# Patient Record
Sex: FEMALE | Race: WHITE | Employment: STUDENT | URBAN - METROPOLITAN AREA
[De-identification: names, ages, dates, MRNs, and addresses within clinical notes are randomized per-mention and may not be internally consistent; named-entity substitution may affect disease eponyms.]

---
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
## 2021-10-09 ENCOUNTER — HOSPITAL ENCOUNTER (EMERGENCY)
Age: 21
Discharge: LEFT BEFORE DISCHARGE | End: 2021-10-09
Payer: COMMERCIAL

## 2021-10-09 VITALS
DIASTOLIC BLOOD PRESSURE: 68 MMHG | WEIGHT: 140 LBS | HEIGHT: 64 IN | OXYGEN SATURATION: 100 % | RESPIRATION RATE: 20 BRPM | HEART RATE: 82 BPM | SYSTOLIC BLOOD PRESSURE: 105 MMHG | TEMPERATURE: 99.1 F | BODY MASS INDEX: 23.9 KG/M2

## 2021-10-10 ENCOUNTER — HOSPITAL ENCOUNTER (EMERGENCY)
Age: 21
Discharge: HOME | End: 2021-10-10
Attending: EMERGENCY MEDICINE
Payer: COMMERCIAL

## 2021-10-10 VITALS
TEMPERATURE: 98.3 F | OXYGEN SATURATION: 99 % | BODY MASS INDEX: 23.9 KG/M2 | HEART RATE: 81 BPM | HEIGHT: 64 IN | SYSTOLIC BLOOD PRESSURE: 110 MMHG | RESPIRATION RATE: 16 BRPM | WEIGHT: 140 LBS | DIASTOLIC BLOOD PRESSURE: 76 MMHG

## 2021-10-10 DIAGNOSIS — L50.9 URTICARIA: Primary | ICD-10-CM

## 2021-10-10 PROCEDURE — 6370000000 HC RX 637 (ALT 250 FOR IP): Performed by: EMERGENCY MEDICINE

## 2021-10-10 PROCEDURE — 99283 EMERGENCY DEPT VISIT LOW MDM: CPT

## 2021-10-10 PROCEDURE — 96372 THER/PROPH/DIAG INJ SC/IM: CPT

## 2021-10-10 PROCEDURE — 6360000002 HC RX W HCPCS: Performed by: EMERGENCY MEDICINE

## 2021-10-10 RX ORDER — PREDNISONE 20 MG/1
40 TABLET ORAL DAILY
Qty: 8 TABLET | Refills: 0 | Status: SHIPPED | OUTPATIENT
Start: 2021-10-10 | End: 2021-10-14

## 2021-10-10 RX ORDER — DEXAMETHASONE SODIUM PHOSPHATE 10 MG/ML
10 INJECTION, SOLUTION INTRAMUSCULAR; INTRAVENOUS ONCE
Status: DISCONTINUED | OUTPATIENT
Start: 2021-10-10 | End: 2021-10-10

## 2021-10-10 RX ORDER — METHYLPREDNISOLONE SODIUM SUCCINATE 125 MG/2ML
60 INJECTION, POWDER, LYOPHILIZED, FOR SOLUTION INTRAMUSCULAR; INTRAVENOUS ONCE
Status: COMPLETED | OUTPATIENT
Start: 2021-10-10 | End: 2021-10-10

## 2021-10-10 RX ORDER — HYDROXYZINE PAMOATE 25 MG/1
25 CAPSULE ORAL EVERY 6 HOURS PRN
Qty: 15 CAPSULE | Refills: 0 | Status: SHIPPED | OUTPATIENT
Start: 2021-10-10 | End: 2021-10-15

## 2021-10-10 RX ORDER — HYDROXYZINE HYDROCHLORIDE 25 MG/1
25 TABLET, FILM COATED ORAL ONCE
Status: COMPLETED | OUTPATIENT
Start: 2021-10-10 | End: 2021-10-10

## 2021-10-10 RX ADMIN — METHYLPREDNISOLONE SODIUM SUCCINATE 60 MG: 125 INJECTION, POWDER, FOR SOLUTION INTRAMUSCULAR; INTRAVENOUS at 06:25

## 2021-10-10 RX ADMIN — HYDROXYZINE HYDROCHLORIDE 25 MG: 25 TABLET, FILM COATED ORAL at 06:25

## 2021-10-10 ASSESSMENT — ENCOUNTER SYMPTOMS
ABDOMINAL PAIN: 0
SHORTNESS OF BREATH: 0
VOMITING: 0
BACK PAIN: 0
SORE THROAT: 0
DIARRHEA: 0
COUGH: 0
EYE REDNESS: 0
SINUS PAIN: 0
NAUSEA: 0
EYE DISCHARGE: 0
COLOR CHANGE: 0

## 2021-10-10 NOTE — ED TRIAGE NOTES
Presents with hive like rash to face back abdomin. Dens SOB denies difficulty swallowing.   Denies known exposure to known allergins

## 2021-10-10 NOTE — ED NOTES
Patient was called in lobby and not found. Registration informed us that the patient said that her rash had resolved and she was leaving.       Sabi Dixon RN  10/09/21 [id number]

## 2021-10-10 NOTE — ED PROVIDER NOTES
[facility] ED  EMERGENCY DEPARTMENT ENCOUNTER      Pt Name: Blaise Wynn  MRN: [id number]  [place] 2000  Date of evaluation: 10/10/2021  Provider: Regina Theodore DO    CHIEF COMPLAINT       Chief Complaint   Patient presents with    Rash     Chief complaint: Hives  History of chief complaint: This 55-year-old female presents the emergency department complaining of intermittent hives today. Patient states earlier this evening broke out with a diffuse itching hives. Patient states she took 2 Benadryl at home and came up to the emergency department. Patient states while waiting the rash resolved so she left without being seen. Patient states early hours of this morning started with a recurrent hives. Patient denies any particular new exposures. States that she does have a roommate here who uses a different laundry detergent than her but she has been exposed to that for a while. Patient states she did not eat or drink anything different or unusual did not leave the house today. Patient states she does have bad anxiety with some panic issues states she has gotten physical reactions with her anxiety in the past the used to have a dry heaving gagging that she was able to get under control wondering if maybe the hives are related to stress states she did feel some increased anxiety just prior to the episodes breaking out. Patient denies any difficulty breathing or swallowing no chest pain palpitation shortness of breath no nausea vomiting diaphoresis no leg pain or swelling. Patient denies the possibility of pregnancy states she is a virgin. Patient states she is on ADHD medication not new or different, no current anxiolytic medicines. Patient denies any suicidal homicidal ideation patient states she did take additional Benadryl approximately 2 hours prior to arrival without significant improvement. Nursing Notes were reviewed.     REVIEW OF SYSTEMS    (2-9 systems for level 4, 10 or more for level 5) Review of Systems   Constitutional: Negative for diaphoresis and fever. HENT: Negative for congestion, sinus pain and sore throat. Eyes: Negative for discharge and redness. Respiratory: Negative for cough and shortness of breath. Cardiovascular: Negative for chest pain, palpitations and leg swelling. Gastrointestinal: Negative for abdominal pain, diarrhea, nausea and vomiting. Genitourinary: Negative for difficulty urinating, dysuria and flank pain. Musculoskeletal: Negative for back pain and neck pain. Skin: Negative for color change and rash. Neurological: Negative for dizziness, weakness, light-headedness and numbness. Hematological: Negative for adenopathy. Except as noted above the remainder of the review of systems was reviewed and negative. PAST MEDICAL HISTORY   History reviewed. No pertinent past medical history. SURGICAL HISTORY     History reviewed. No pertinent surgical history. CURRENT MEDICATIONS       Previous Medications    METHYLPHENIDATE (RITALIN) 10 MG TABLET    Take 10 mg by mouth 3 times daily. ALLERGIES     Patient has no known allergies. FAMILY HISTORY     History reviewed. No pertinent family history.        SOCIAL HISTORY       Social History     Socioeconomic History    Marital status: Single     Spouse name: None    Number of children: None    Years of education: None    Highest education level: None   Occupational History    None   Tobacco Use    Smoking status: Never Smoker    Smokeless tobacco: Never Used   Substance and Sexual Activity    Alcohol use: Never    Drug use: Never    Sexual activity: None   Other Topics Concern    None   Social History Narrative    None     Social Determinants of Health     Financial Resource Strain:     Difficulty of Paying Living Expenses:    Food Insecurity:     Worried About Running Out of Food in the Last Year:     Rohit of Food in the Last Year:    Transportation Needs:     Lack of Transportation (Medical):  Lack of Transportation (Non-Medical):    Physical Activity:     Days of Exercise per Week:     Minutes of Exercise per Session:    Stress:     Feeling of Stress :    Social Connections:     Frequency of Communication with Friends and Family:     Frequency of Social Gatherings with Friends and Family:     Attends Baptism Services:     Active Member of Clubs or Organizations:     Attends Club or Organization Meetings:     Marital Status:    Intimate Partner Violence:     Fear of Current or Ex-Partner:     Emotionally Abused:     Physically Abused:     Sexually Abused:            PHYSICAL EXAM    (up to 7 for level 4, 8 or more for level 5)     ED Triage Vitals [10/10/21 0523]   BP Temp Temp Source Pulse Resp SpO2 Height Weight   110/76 98.3 °F (36.8 °C) Oral 81 16 99 % 5' 4\" (1.626 m) 140 lb (63.5 kg)       Physical Exam   General appearance: Patient is awake alert interactive, appears anxious with pressured speech, flight of ideas, talkative, nontoxic in no acute distress  Head is atraumatic normocephalic  Eyes pupils are equal and reactive sclera white conjunctive are pink  Oral pharyngeal cavity is pink with good moisture, no exudates or ulcerations no asymmetry, the airway is widely patent  Neck: Supple no meningeal signs no adenopathy no JVD  Heart: Regular rate and rhythm no gross murmurs rubs or clicks  Lungs: Breath sounds are clear with good air movement throughout no active wheezes rales or rhonchi no respiratory distress  Abdomen: Soft nontender with good bowel sounds no rebound rigidity or guarding no firm or pulsatile masses, no gross distention, femoral pulses full and symmetric  Back: Nontender to palpation no costovertebral angle tenderness  Skin: Warm and dry diffuse scattered urticarial welts most prominent on the extremities.  Areas steven easily there are no petechiae no vesicles  Lower extremities: No edema or calf tenderness or asymmetry. EMERGENCY DEPARTMENT COURSE and DIFFERENTIAL DIAGNOSIS/MDM:   Vitals:    Vitals:    10/10/21 0523   BP: 110/76   Pulse: 81   Resp: 16   Temp: 98.3 °F (36.8 °C)   TempSrc: Oral   SpO2: 99%   Weight: 140 lb (63.5 kg)   Height: 5' 4\" (1.626 m)     Treatment and course: Patient was given a soluMedrol 60 mg IM along with hydroxyzine 25 mg p.o. FINAL IMPRESSION      1. Urticaria          DISPOSITION/PLAN   DISPOSITION Discharge - Pending Orders Complete 10/10/2021 05:49:51 AM  Patient discharged home given a short course of prednisone for 4 days as well as a prescription for Vistaril No. 15 as needed for rash. Patient advised to stop the Benadryl monitor closely return if any change or worsening any chest pain palpitations difficulty breathing or swallowing weak or dizzy. Patient to follow-up with primary physician for repeat assessment in the next 2 to 3 days    PATIENT REFERRED TO:  Osmar Merrill [address]  [address] [facility] [address]. [id number]  In 2 days        DISCHARGE MEDICATIONS:  New Prescriptions    HYDROXYZINE (VISTARIL) 25 MG CAPSULE    Take 1 capsule by mouth every 6 hours as needed for Itching    PREDNISONE (DELTASONE) 20 MG TABLET    Take 2 tablets by mouth daily for 4 days     Controlled Substances Monitoring:     No flowsheet data found.     (Please note that portions of this note were completed with a voice recognition program.  Efforts were made to edit the dictations but occasionally words are mis-transcribed.)    Key Lamb DO (electronically signed)  Attending Emergency Physician            Key Lamb DO  10/10/21 330 Abdelrahman Zambrano DO  10/10/21 [id number]

## 2021-10-10 NOTE — ED TRIAGE NOTES
Pt. Presents to ED with complaints of hives to bilateral legs, arms, chest, and back. Areas are itching and raised. Pt. States she has not changed anything or ate anything new.

## 2021-10-11 ENCOUNTER — OFFICE VISIT (OUTPATIENT)
Dept: FAMILY MEDICINE CLINIC | Age: 21
End: 2021-10-11
Payer: COMMERCIAL

## 2021-10-11 VITALS
HEART RATE: 83 BPM | HEIGHT: 64 IN | OXYGEN SATURATION: 99 % | TEMPERATURE: 97.3 F | BODY MASS INDEX: 25.81 KG/M2 | SYSTOLIC BLOOD PRESSURE: 118 MMHG | DIASTOLIC BLOOD PRESSURE: 72 MMHG | WEIGHT: 151.2 LBS

## 2021-10-11 DIAGNOSIS — T78.40XD ALLERGIC REACTION, SUBSEQUENT ENCOUNTER: Primary | ICD-10-CM

## 2021-10-11 PROCEDURE — 99213 OFFICE O/P EST LOW 20 MIN: CPT

## 2021-10-11 RX ORDER — METHYLPHENIDATE HYDROCHLORIDE 5 MG/1
TABLET ORAL
COMMUNITY

## 2021-10-11 RX ORDER — METHYLPHENIDATE HYDROCHLORIDE 20 MG/1
CAPSULE, EXTENDED RELEASE ORAL
COMMUNITY

## 2021-10-11 ASSESSMENT — ENCOUNTER SYMPTOMS
SHORTNESS OF BREATH: 0
EYE DISCHARGE: 0
CHEST TIGHTNESS: 0
EYE ITCHING: 0
WHEEZING: 0
COUGH: 0
COLOR CHANGE: 0
EYE PAIN: 0

## 2021-10-11 ASSESSMENT — PATIENT HEALTH QUESTIONNAIRE - PHQ9
SUM OF ALL RESPONSES TO PHQ QUESTIONS 1-9: 0
SUM OF ALL RESPONSES TO PHQ QUESTIONS 1-9: 0
2. FEELING DOWN, DEPRESSED OR HOPELESS: 0
SUM OF ALL RESPONSES TO PHQ9 QUESTIONS 1 & 2: 0
1. LITTLE INTEREST OR PLEASURE IN DOING THINGS: 0
SUM OF ALL RESPONSES TO PHQ QUESTIONS 1-9: 0

## 2021-10-11 NOTE — PROGRESS NOTES
[id number]  Encounter  CHIEF COMPLAINT       Chief Complaint   Patient presents with    Other     pt has hives all over her body and was seen in the ER for it just starting taking the medcation yesterday. HISTORY OF PRESENT ILLNESS   Iglesia Thorpe is a 22 y.o. female who presents with:  HPI  Seen in ER yesterday for hives, placed on Benadryl and prednisone 40 mg daily for 5 days. Patient reports symptoms are resolved with prednisone however she is uncertain if she should take full course of therapy. REVIEW OF SYSTEMS     Review of Systems   Constitutional: Negative for chills, fatigue and fever. Eyes: Negative for pain, discharge and itching. Respiratory: Negative for cough, chest tightness, shortness of breath and wheezing. Skin: Negative for color change, pallor, rash and wound. Allergic/Immunologic: Negative for environmental allergies, food allergies and immunocompromised state. Neurological: Negative for dizziness, light-headedness and numbness. Hematological: Negative for adenopathy. PAST MEDICAL HISTORY   History reviewed. No pertinent past medical history. SURGICAL HISTORY     Patient  has no past surgical history on file. CURRENT MEDICATIONS       Previous Medications    HYDROXYZINE (VISTARIL) 25 MG CAPSULE    Take 1 capsule by mouth every 6 hours as needed for Itching    METHYLPHENIDATE (METADATE CD) 20 MG EXTENDED RELEASE CAPSULE    methylphenidate CD 20 mg biphasic 30-70 capsule,extended release   TAKE 1 CAPSULE BY MOUTH EVERY MORNING    METHYLPHENIDATE (RITALIN) 10 MG TABLET    Take 10 mg by mouth 3 times daily. METHYLPHENIDATE (RITALIN) 5 MG TABLET    methylphenidate 5 mg tablet   TAKE 1 TO 2 TABLET BY MOUTH DAILY AS NEEDED FOR FOCUS    PREDNISONE (DELTASONE) 20 MG TABLET    Take 2 tablets by mouth daily for 4 days     ALLERGIES     Patient is has No Known Allergies.   FAMILY HISTORY     Patient'sfamily history is not on file.  HISTORY     Patient  reports that she has never smoked. She has never used smokeless tobacco. She reports that she does not drink alcohol and does not use drugs. PHYSICAL EXAM     VITALS  BP: 118/72, Temp: 97.3 °F (36.3 °C), Pulse: 83,  , SpO2: 99 %  Physical Exam  Constitutional:       General: She is not in acute distress. Appearance: Normal appearance. She is not ill-appearing or diaphoretic. HENT:      Head: Normocephalic. Mouth/Throat:      Mouth: Mucous membranes are moist.   Eyes:      General:         Right eye: No discharge. Left eye: No discharge. Conjunctiva/sclera: Conjunctivae normal.   Cardiovascular:      Rate and Rhythm: Normal rate and regular rhythm. Pulmonary:      Effort: Pulmonary effort is normal. No respiratory distress. Breath sounds: Normal breath sounds. Musculoskeletal:         General: Normal range of motion. Skin:     General: Skin is warm. Capillary Refill: Capillary refill takes less than 2 seconds. Coloration: Skin is not jaundiced or pale. Findings: No bruising, erythema, lesion or rash. Neurological:      General: No focal deficit present. Mental Status: She is alert and oriented to person, place, and time. Mental status is at baseline. Gait: Gait normal.   Psychiatric:         Mood and Affect: Mood normal.         Behavior: Behavior normal.       READY CARE COURSE   No orders of the defined types were placed in this encounter. Labs:  No results found for this visit on 10/11/21. IMAGING:  No orders to display     Scheduled Meds:  Continuous Infusions:  PRN Meds:. PROCEDURES:  FINAL IMPRESSION      1. Allergic reaction, subsequent encounter        DISPOSITION/PLAN     HISTORY OF PRESENT ILLNESS   Theresa Maradiaga is a 22 y.o. female who presents with seen yesterday in the ER for symptoms of rash and hives. Placed on prednisone.   Patient took prednisone yesterday and half dose today, she is questioning whether she should continue therapy. Pt is afebrile has nontoxic appearance and VS are stable. On physical exam patient has no hives currently skin is pink warm and dry, patient is slightly anxious appearing. Advised patient that she should continue therapy that if she did not she could be subject to rebound effect. Advised patient that taking Benadryl may help, effects of steroids and help her sleep. If she cannot tolerate side effects of steroids I recommend she cut dose in half. PATIENT REFERRED TO:  Return if symptoms worsen or fail to improve, for Follow up with PCP. DISCHARGE MEDICATIONS:  New Prescriptions    No medications on file     Cannot display discharge medications since this is not an admission.        Remington Estrada, APRN - CNP

## 2021-10-11 NOTE — PATIENT INSTRUCTIONS
Continue current course of therapy as you were prescribed yesterday, if that effects of prednisone proved to be difficult for you to manage cut dose in half